# Patient Record
Sex: MALE | Race: WHITE
[De-identification: names, ages, dates, MRNs, and addresses within clinical notes are randomized per-mention and may not be internally consistent; named-entity substitution may affect disease eponyms.]

---

## 2018-05-15 NOTE — ED
Bite Injury/Animal





- HPI Summary


HPI Summary: 


27 male presents with tick bite to the right thigh.  He has not removed the 

tick.  He states it is probably from today.  He denies any fevers.  He denies 

any spreading redness.  He denies any body aches.  He denies any history of 

tick exposure.  He has no medical conditions.  He is not allergy to doxycycline.








- History of Current Complaint


Chief Complaint: EDRashSkinAbscess


Stated Complaint: TICK BITE


Time Seen by Provider: 05/15/18 00:11


Pain Intensity: 0





- Allergies/Home Medications


Allergies/Adverse Reactions: 


 Allergies











Allergy/AdvReac Type Severity Reaction Status Date / Time


 


No Known Allergies Allergy   Verified 05/21/14 16:53














PMH/Surg Hx/FS Hx/Imm Hx


Endocrine/Hematology History: 


   Denies: Hx Anticoagulant Therapy


Cardiovascular History: 


   Denies: Hx Hypertension


Infectious Disease History: No


Infectious Disease History: 


   Denies: Traveled Outside the [place] in Last 30 Days





- Family History


Known Family History: 


   Negative: Diabetes





- Social History


Alcohol Use: Rare


Substance Use Type: Reports: None


Smoking Status (MU): Never Smoked Tobacco





Review of Systems


Negative: Fever


Negative: Chest Pain


Negative: Shortness Of Breath


Positive: Other - tick bite right thigh


All Other Systems Reviewed And Are Negative: Yes





Physical Exam


Triage Information Reviewed: Yes


Vital Signs On Initial Exam: 


 Initial Vitals











Temp Pulse Resp BP Pulse Ox


 


 98.2 F   72   16   136/88   99 


 


 05/14/18 23:22  05/14/18 23:22  05/14/18 23:22  05/14/18 23:22  05/14/18 23:22











Vital Signs Reviewed: Yes


Appearance: Positive: Well-Appearing


Skin: Positive: Warm, Dry, Other - Tick bite right thigh with tick present


Head/Face: Positive: Normal Head/Face Inspection


Eyes: Positive: Normal, Conjunctiva Clear


ENT: Positive: Pharynx normal


Respiratory/Lung Sounds: Positive: Clear to Auscultation, Breath Sounds Present


Cardiovascular: Positive: Normal, RRR


Musculoskeletal: Positive: Normal


Neurological: Positive: Normal


Psychiatric: Positive: Normal





Diagnostics





- Vital Signs


 Vital Signs











  Temp Pulse Resp BP Pulse Ox


 


 05/14/18 23:22  98.2 F  72  16  136/88  99














- Laboratory


Lab Statement: Any lab studies that have been ordered have been reviewed, and 

results considered in the medical decision making process.





Bite Injury Course/Dx





- Course


Course Of Treatment: 27 male presents with tick bite to the right thigh.  He 

has not removed the tick.  He states it is probably from today.  He denies any 

fevers.  He denies any spreading redness.  He denies any body aches.  He denies 

any history of tick exposure.  He has no medical conditions.  He is not allergy 

to doxycycline.  on exam has tick bite right thigh. removed with tick twister. 

gave ppx dose of doxy even though was easy to remove tick. patient understand 

and agrees with plan.





- Diagnoses


Differential Diagnosis/HQI/PQRI: Positive: Puncture, Other - tick, lyme


Provider Diagnosis: 


 Tick bite








Discharge





- Sign-Out/Discharge


Documenting (check all that apply): Discharge/Admit/Transfer





- Discharge Plan


Condition: Good


Disposition: HOME


Patient Education Materials:  Tick Bite (ED)


Referrals: 


No Primary Care Phys,NOPCP [Primary Care Provider] - 


Additional Instructions: 


You have been prophylactically treated for Lyme disease 


Return to ED if develop any rash or signs of infection





- Billing Disposition and Condition


Condition: GOOD


Disposition: HOME

## 2020-04-16 ENCOUNTER — HOSPITAL ENCOUNTER (EMERGENCY)
Dept: HOSPITAL 25 - UCCORT | Age: 30
Discharge: HOME | End: 2020-04-16
Payer: COMMERCIAL

## 2020-04-16 DIAGNOSIS — Z87.891: ICD-10-CM

## 2020-04-16 DIAGNOSIS — Z91.030: ICD-10-CM

## 2020-04-16 DIAGNOSIS — R50.9: ICD-10-CM

## 2020-04-16 DIAGNOSIS — Z20.828: ICD-10-CM

## 2020-04-16 DIAGNOSIS — J40: Primary | ICD-10-CM

## 2020-04-16 PROCEDURE — 99211 OFF/OP EST MAY X REQ PHY/QHP: CPT

## 2020-04-16 PROCEDURE — 87635 SARS-COV-2 COVID-19 AMP PRB: CPT

## 2020-04-16 PROCEDURE — G0463 HOSPITAL OUTPT CLINIC VISIT: HCPCS

## 2020-04-16 NOTE — UC
[facility] HPI


HPI Summary: 





30 yo with a one week history of cough, with onset of fever to 102 last night. 

His cough has been worsening, and he has more discomfort in his chest. 


Elevated temp yesterday for 12 hours, and his temp is 99 this morning without 

use of a fever reducer. He worked through the fever. 


Chest is sore in the low sternal area and in bilateral posterior chest. Cough 

productive. 


Activity has been affected and he is out of breath at work. 


Works at [employer], work is physical and he was unable to leave his shift. Hx of 

bronchitis in the past, has never used inhalers. 


Has a sore throat since this morning, bitemporal headache. 


Normal appetie overall. Drinking well. 


No nausea, vomiting 


No eye drainage, feels fatigued. 





[facility] PMH


Previously Healthy: Yes


Endocrine/Hematology History: 


   Denies: Hx Anticoagulant Therapy


Cardiovascular History: 


   Denies: Hx Hypertension


Respiratory History: Reports: Other Respiratory Problems/Disorders - past hx of 

bronchitis about 2015


   Denies: Hx Asthma, Hx Chronic Bronchitis





- Surgical History


Surgical History: None


Infectious Disease History: No





- Family History


Known Family History: Positive: Cardiac Disease - paternal unlces, Other - 

mother has breast cancer. 


   Negative: Diabetes





- Social History


Occupation: Employed Full-time


Lives: With Family - lives with 2 yo son, wife, father-in-law and his partner.


Alcohol Use: Occasionally


Substance Use Type: Reports: None


Smoking Status (MU): Former Smoker


Type: Cigarettes


Length of Time of Smoking/Using Tobacco: 1 PPD x 10 Years





[facility] ROS


All Other Systems Reviewed And Are Negative: Yes


Positive: Fever, Fatigue


Eyes: Negative


Negative: Blurred Vision, Drainage


ENT: Negative


Cardiovascular: Negative


Positive: Shortness Of Breath - with stair climbing and at work last night , 

Cough


Gastrointestinal: Negative


Negative: Vomiting, Diarrhea


Genitourinary: Negative


Musculoskeletal: Negative


Skin: Negative


Neurological/Mental Status: Negative


Negative: Headache, Weakness


Psychological: Normal





[facility] PE


Telehealth Physical Exam: 





Telehealth video shows a man in no acute distress. Normal color, speaks easily 

in full sentences without hesitation. Occasional cough observed, no paroxysms. 

He is alert with appropriate manner, normal affect. 


Appearance: Positive: Well-Appearing, No Pain Distress, Well-Nourished





[facility] Course/Dx


Assessment/Plan: 





Given fever and productive cough, will begin treatment with Azithromycin. 


Requests COVID testing and advised flu testing as well. 


Provider Diagnoses: 


 Bronchitis








[facility] Telehealth Disposition


Provider Recommendation for Treatment: Urgent Care


Telehealth Visit: Patient Consented Verbally to Telehealth Visit


Telehealth Patient Statement: The patient should understand that they are 

communicating with their provider via a secure communication platform and that 

all the same privacy and confidentiality rules apply. They will also be 

responsible for copayments or coinsurances that apply to any Telehealth visit.


Patient Identifiers: 2 Patient Identifiers Verified for Telehealth Visit - name 

and date of birth


Telehealth Visit Start Time: 08:45


Telehealth Visit End Time: 09:00


Telehealth Provider Attestation: The above services were appropriate to provide 

in a Telehealth setting.